# Patient Record
(demographics unavailable — no encounter records)

---

## 2024-12-13 NOTE — HEALTH RISK ASSESSMENT
[No] : In the past 12 months have you used drugs other than those required for medical reasons? No [Any fall with injury in past year] : Patient reported fall with injury in the past year [Patient not ambulatory] : Patient is not ambulatory [Low Salt Diet] : low salt [General Adherence] : general adherence [Learning/Retaining New Information] : difficulty learning/retaining new information [Handling Complex Tasks] : difficulty handling complex tasks [Transportation] : transportation [With Family] : lives with family [# of Members in Household ___] :  household currently consist of [unfilled] member(s) [Retired] : retired [] :  [Full assistance needed] : managing finances [With Patient/Caregiver] : , with patient/caregiver [Reviewed no changes] : Reviewed, no changes [DNR] : DNR [Last Verification Date: ___] : Last Verification Date: [unfilled] [I will adhere to the patient's wishes.] : I will adhere to the patient's wishes. [Time Spent: ___ minutes] : Time Spent: [unfilled] minutes [Name: ___] : Health Care Proxy's Name: [unfilled]  [Relationship: ___] : Relationship: [unfilled] [de-identified] : w/c use since fall in Oct [de-identified] : lives with daughter in law's brother, has 24/7 A private hire [FreeTextEntry1] : can brush her teeth [FreeTextEntry2] : assist with dressing [FreeTextEntry5] : can feed self with some foods [AdvancecareDate] : 12/24 [FreeTextEntry4] : has MOLST in the home, trial of non invasive respiratory support and feeding tube

## 2024-12-13 NOTE — PLAN
[FreeTextEntry1] : Rx for nebulizer sent to pharmacy [facility]  INR 5.3 Voicemail left for NP Pradeep instructed to hold coumadin dose for tonight follow up with Dr Gallegos on 12/13/24

## 2024-12-13 NOTE — HISTORY OF PRESENT ILLNESS
[Post-hospitalization from ___ Hospital] : Post-hospitalization from [unfilled] Hospital [Admitted on: ___] : The patient was admitted on [unfilled] [Discharged on ___] : discharged on [unfilled] [Discharge Summary] : discharge summary [Discharge Med List] : discharge medication list [Other: ____] : [unfilled] [Med Reconciliation] : medication reconciliation has been completed [Patient Contacted By: ____] : and contacted by [unfilled] [FreeTextEntry2] : 88F PMH vascular dementia (AOx2 baseline), HFrEF, chronic AFib and mechanical MVR (on Coumadin), breast cancer (s/p lumpectomy/RT) admit 10/2024 to [facility] d/t R pelvic rami and sacral ala fx s/p fall in addition to decompensated HFrEF c/c/b supratherapeutic INR dc'd to [facility], currently treated for Enterococcus UTI on ampicillin beginning 11/30 for planned 5 day course per ED, presents today for productive cough with CP/abd pain and EKG abn (automatic read resulted as acute MI) per ED. Patient AOx1 (self) at bedside which per health aide at bedside is part of waxing/waning baseline, is unable to meaningfully participate in interview, denies all complaints including CP/abd pain/SOB. Health aide confirms reason for transfer, otherwise does not offer more collateral. Attempt to contact emergency contact [facility] for collateral unsuccessful, left VM. neutrophil predominance w/o leukocytosis, ; HST 60 -> 65, proBNP 1145; K/ALP/AST elevations though hemolyzed; VBG 7.37/55/24/32 lactate 2.6   RSV+  CXR (prelim): Pulmonary edema, similar to prior exam. s/p lasix 40mg IV  (03 Dec 2024 02:27) Hospital Course: Respiratory syncytial virus (RSV). now on room air supportive care, monitor.Acute chest pain. rising trops-- will continue to trend until peak-- likely demand ischemia due to rsv per cards, no need for ischemic eval at this time TTE with severe TR and mod AS-- per structural heart to follow up outpatient once acute illness resolve  cardiology and structural heart following. Acute UTI on ampicillin 500mg PO q6h 11/30-12/5 by [facility] physician for Enterococcus UTI (per [facility] paperwork sensitive). Chronic HFrEF (heart failure with reduced ejection fraction) euvolemic TTE with severe TR and mod AS c/w lasix 20 mg po qod c/w Toprol XL 50mg QD cards following. Chronic atrial fibrillation +mech MVR INR 3.73- will give coumadin 2 mg today. Vascular dementia. AOx1 (self) at this time, aide at bedside states is part of waxing/waning baseline fall, aspiration precautions. Important Medication Changes and Reason: Active or Pending Issues Requiring Follow-up: Advanced Directives:   Full code  [x ] DNR  [ ] Hospice 89 y/o female seen today for TCM initial visit. She is frail, awake, confused sitting in wheelchair. Accompanied by daughter in law Maria Isabel who is assisting with translation, and private hire CLARENCE Irvin? She appears to be in no acute distress, breathing even and unlabored with report of episodes of coughing, noted to be pocketing food and spitting out small pieces. Reviewed the discharge instructions regarding puree diet with daughter in law Advised to resume puree diet until she is seen by S/S. Risk of aspiration pneumonia education provided. [facility] services provided by [facility] RN scheduled for Thursday, PT/OT S/S  Medication review done, pt does not have nebulizer, Rx sent [facility].  INR was done at home results 5.3, advised to hold coumadin. VM left for NP Fernández with results and coumadin hold.  TCM education provided and yellow card left in the home.

## 2024-12-13 NOTE — HISTORY OF PRESENT ILLNESS
[Post-hospitalization from ___ Hospital] : Post-hospitalization from [unfilled] Hospital [Admitted on: ___] : The patient was admitted on [unfilled] [Discharged on ___] : discharged on [unfilled] [Discharge Summary] : discharge summary [Discharge Med List] : discharge medication list [Other: ____] : [unfilled] [Med Reconciliation] : medication reconciliation has been completed [Patient Contacted By: ____] : and contacted by [unfilled] [FreeTextEntry2] : 88F PMH vascular dementia (AOx2 baseline), HFrEF, chronic AFib and mechanical MVR (on Coumadin), breast cancer (s/p lumpectomy/RT) admit 10/2024 to [facility] d/t R pelvic rami and sacral ala fx s/p fall in addition to decompensated HFrEF c/c/b supratherapeutic INR dc'd to [facility], currently treated for Enterococcus UTI on ampicillin beginning 11/30 for planned 5 day course per ED, presents today for productive cough with CP/abd pain and EKG abn (automatic read resulted as acute MI) per ED. Patient AOx1 (self) at bedside which per health aide at bedside is part of waxing/waning baseline, is unable to meaningfully participate in interview, denies all complaints including CP/abd pain/SOB. Health aide confirms reason for transfer, otherwise does not offer more collateral. Attempt to contact emergency contact [facility] for collateral unsuccessful, left VM. neutrophil predominance w/o leukocytosis, ; HST 60 -> 65, proBNP 1145; K/ALP/AST elevations though hemolyzed; VBG 7.37/55/24/32 lactate 2.6   RSV+  CXR (prelim): Pulmonary edema, similar to prior exam. s/p lasix 40mg IV  (03 Dec 2024 02:27) Hospital Course: Respiratory syncytial virus (RSV). now on room air supportive care, monitor.Acute chest pain. rising trops-- will continue to trend until peak-- likely demand ischemia due to rsv per cards, no need for ischemic eval at this time TTE with severe TR and mod AS-- per structural heart to follow up outpatient once acute illness resolve  cardiology and structural heart following. Acute UTI on ampicillin 500mg PO q6h 11/30-12/5 by [facility] physician for Enterococcus UTI (per [facility] paperwork sensitive). Chronic HFrEF (heart failure with reduced ejection fraction) euvolemic TTE with severe TR and mod AS c/w lasix 20 mg po qod c/w Toprol XL 50mg QD cards following. Chronic atrial fibrillation +mech MVR INR 3.73- will give coumadin 2 mg today. Vascular dementia. AOx1 (self) at this time, aide at bedside states is part of waxing/waning baseline fall, aspiration precautions. Important Medication Changes and Reason: Active or Pending Issues Requiring Follow-up: Advanced Directives:   Full code  [x ] DNR  [ ] Hospice 89 y/o female seen today for TCM initial visit. She is frail, awake, confused sitting in wheelchair. Accompanied by daughter in law Maria Isabel who is assisting with translation, and private hire CLARENCE Irvin? She appears to be in no acute distress, breathing even and unlabored with report of episodes of coughing, noted to be pocketing food and spitting out small pieces. Reviewed the discharge instructions regarding puree diet with daughter in law Advised to resume puree diet until she is seen by S/S. Risk of aspiration pneumonia education provided. [facility] services provided by [facility] RN scheduled for Thursday, PT/OT S/S  Medication review done, pt does not have nebulizer, Rx sent [facility].  INR was done at home results 5.3, advised to hold coumadin. VM left for NP Fernández with results and coumadin hold.  TCM education provided and yellow card left in the home.

## 2024-12-13 NOTE — HEALTH RISK ASSESSMENT
[No] : In the past 12 months have you used drugs other than those required for medical reasons? No [Any fall with injury in past year] : Patient reported fall with injury in the past year [Patient not ambulatory] : Patient is not ambulatory [Low Salt Diet] : low salt [General Adherence] : general adherence [Learning/Retaining New Information] : difficulty learning/retaining new information [Handling Complex Tasks] : difficulty handling complex tasks [Transportation] : transportation [With Family] : lives with family [# of Members in Household ___] :  household currently consist of [unfilled] member(s) [Retired] : retired [] :  [Full assistance needed] : managing finances [With Patient/Caregiver] : , with patient/caregiver [Reviewed no changes] : Reviewed, no changes [DNR] : DNR [Last Verification Date: ___] : Last Verification Date: [unfilled] [I will adhere to the patient's wishes.] : I will adhere to the patient's wishes. [Time Spent: ___ minutes] : Time Spent: [unfilled] minutes [Name: ___] : Health Care Proxy's Name: [unfilled]  [Relationship: ___] : Relationship: [unfilled] [de-identified] : w/c use since fall in Oct [de-identified] : lives with daughter in law's brother, has 24/7 A private hire [FreeTextEntry1] : can brush her teeth [FreeTextEntry2] : assist with dressing [FreeTextEntry5] : can feed self with some foods [AdvancecareDate] : 12/24 [FreeTextEntry4] : has MOLST in the home, trial of non invasive respiratory support and feeding tube

## 2024-12-13 NOTE — HISTORY OF PRESENT ILLNESS
[Post-hospitalization from ___ Hospital] : Post-hospitalization from [unfilled] Hospital [Admitted on: ___] : The patient was admitted on [unfilled] [Discharged on ___] : discharged on [unfilled] [Discharge Summary] : discharge summary [Discharge Med List] : discharge medication list [Other: ____] : [unfilled] [Med Reconciliation] : medication reconciliation has been completed [Patient Contacted By: ____] : and contacted by [unfilled] [FreeTextEntry2] : 88F PMH vascular dementia (AOx2 baseline), HFrEF, chronic AFib and mechanical MVR (on Coumadin), breast cancer (s/p lumpectomy/RT) admit 10/2024 to [facility] d/t R pelvic rami and sacral ala fx s/p fall in addition to decompensated HFrEF c/c/b supratherapeutic INR dc'd to [facility], currently treated for Enterococcus UTI on ampicillin beginning 11/30 for planned 5 day course per ED, presents today for productive cough with CP/abd pain and EKG abn (automatic read resulted as acute MI) per ED. Patient AOx1 (self) at bedside which per health aide at bedside is part of waxing/waning baseline, is unable to meaningfully participate in interview, denies all complaints including CP/abd pain/SOB. Health aide confirms reason for transfer, otherwise does not offer more collateral. Attempt to contact emergency contact [facility] for collateral unsuccessful, left VM. neutrophil predominance w/o leukocytosis, ; HST 60 -> 65, proBNP 1145; K/ALP/AST elevations though hemolyzed; VBG 7.37/55/24/32 lactate 2.6   RSV+  CXR (prelim): Pulmonary edema, similar to prior exam. s/p lasix 40mg IV  (03 Dec 2024 02:27) Hospital Course: Respiratory syncytial virus (RSV). now on room air supportive care, monitor.Acute chest pain. rising trops-- will continue to trend until peak-- likely demand ischemia due to rsv per cards, no need for ischemic eval at this time TTE with severe TR and mod AS-- per structural heart to follow up outpatient once acute illness resolve  cardiology and structural heart following. Acute UTI on ampicillin 500mg PO q6h 11/30-12/5 by [facility] physician for Enterococcus UTI (per [facility] paperwork sensitive). Chronic HFrEF (heart failure with reduced ejection fraction) euvolemic TTE with severe TR and mod AS c/w lasix 20 mg po qod c/w Toprol XL 50mg QD cards following. Chronic atrial fibrillation +mech MVR INR 3.73- will give coumadin 2 mg today. Vascular dementia. AOx1 (self) at this time, aide at bedside states is part of waxing/waning baseline fall, aspiration precautions. Important Medication Changes and Reason: Active or Pending Issues Requiring Follow-up: Advanced Directives:   Full code  [x ] DNR  [ ] Hospice 89 y/o female seen today for TCM initial visit. She is frail, awake, confused sitting in wheelchair. Accompanied by daughter in law Maria Isabel who is assisting with translation, and private hire CLARENCE Irvin? She appears to be in no acute distress, breathing even and unlabored with report of episodes of coughing, noted to be pocketing food and spitting out small pieces. Reviewed the discharge instructions regarding puree diet with daughter in law Advised to resume puree diet until she is seen by S/S. Risk of aspiration pneumonia education provided. CH[facility] services provided by [facility] RN scheduled for Thursday, PT/OT S/S  Medication review done, pt does not have nebulizer, Rx sent to [facility].  INR was done at home results 5.3, advised to hold coumadin. VM left for NP Fernández with results and coumadin hold.  TCM education provided and yellow card left in the home.

## 2024-12-13 NOTE — HISTORY OF PRESENT ILLNESS
[Post-hospitalization from ___ Hospital] : Post-hospitalization from [unfilled] Hospital [Admitted on: ___] : The patient was admitted on [unfilled] [Discharged on ___] : discharged on [unfilled] [Discharge Summary] : discharge summary [Discharge Med List] : discharge medication list [Other: ____] : [unfilled] [Med Reconciliation] : medication reconciliation has been completed [Patient Contacted By: ____] : and contacted by [unfilled] [FreeTextEntry2] : 88F PMH vascular dementia (AOx2 baseline), HFrEF, chronic AFib and mechanical MVR (on Coumadin), breast cancer (s/p lumpectomy/RT) admit 10/2024 to [facility] d/t R pelvic rami and sacral ala fx s/p fall in addition to decompensated HFrEF c/c/b supratherapeutic INR dc'd to [facility], currently treated for Enterococcus UTI on ampicillin beginning 11/30 for planned 5 day course per ED, presents today for productive cough with CP/abd pain and EKG abn (automatic read resulted as acute MI) per ED. Patient AOx1 (self) at bedside which per health aide at bedside is part of waxing/waning baseline, is unable to meaningfully participate in interview, denies all complaints including CP/abd pain/SOB. Health aide confirms reason for transfer, otherwise does not offer more collateral. Attempt to contact emergency contact [facility] for collateral unsuccessful, left VM. neutrophil predominance w/o leukocytosis, ; HST 60 -> 65, proBNP 1145; K/ALP/AST elevations though hemolyzed; VBG 7.37/55/24/32 lactate 2.6   RSV+  CXR (prelim): Pulmonary edema, similar to prior exam. s/p lasix 40mg IV  (03 Dec 2024 02:27) Hospital Course: Respiratory syncytial virus (RSV). now on room air supportive care, monitor.Acute chest pain. rising trops-- will continue to trend until peak-- likely demand ischemia due to rsv per cards, no need for ischemic eval at this time TTE with severe TR and mod AS-- per structural heart to follow up outpatient once acute illness resolve  cardiology and structural heart following. Acute UTI on ampicillin 500mg PO q6h 11/30-12/5 by [facility] physician for Enterococcus UTI (per [facility] paperwork sensitive). Chronic HFrEF (heart failure with reduced ejection fraction) euvolemic TTE with severe TR and mod AS c/w lasix 20 mg po qod c/w Toprol XL 50mg QD cards following. Chronic atrial fibrillation +mech MVR INR 3.73- will give coumadin 2 mg today. Vascular dementia. AOx1 (self) at this time, aide at bedside states is part of waxing/waning baseline fall, aspiration precautions. Important Medication Changes and Reason: Active or Pending Issues Requiring Follow-up: Advanced Directives:   Full code  [x ] DNR  [ ] Hospice 87 y/o female seen today for TCM initial visit. She is frail, awake, confused sitting in wheelchair. Accompanied by daughter in law Maria Isabel who is assisting with translation, and private hire CLARENCE Irvin? She appears to be in no acute distress, breathing even and unlabored with report of episodes of coughing, noted to be pocketing food and spitting out small pieces. Reviewed the discharge instructions regarding puree diet with daughter in law Advised to resume puree diet until she is seen by S/S. Risk of aspiration pneumonia education provided. CHHA services provided by [facility] RN scheduled for Thursday, PT/OT S/S  Medication review done, pt does not have nebulizer, Rx sent [facility].  INR was done at home results 5.3, advised to hold coumadin. VM left for NP Fernández with results and coumadin hold.  TCM education provided and yellow card left in the home.

## 2024-12-13 NOTE — HEALTH RISK ASSESSMENT
[No] : In the past 12 months have you used drugs other than those required for medical reasons? No [Any fall with injury in past year] : Patient reported fall with injury in the past year [Patient not ambulatory] : Patient is not ambulatory [Low Salt Diet] : low salt [General Adherence] : general adherence [Learning/Retaining New Information] : difficulty learning/retaining new information [Handling Complex Tasks] : difficulty handling complex tasks [Transportation] : transportation [With Family] : lives with family [# of Members in Household ___] :  household currently consist of [unfilled] member(s) [Retired] : retired [] :  [Full assistance needed] : managing finances [With Patient/Caregiver] : , with patient/caregiver [Reviewed no changes] : Reviewed, no changes [DNR] : DNR [Last Verification Date: ___] : Last Verification Date: [unfilled] [I will adhere to the patient's wishes.] : I will adhere to the patient's wishes. [Time Spent: ___ minutes] : Time Spent: [unfilled] minutes [Name: ___] : Health Care Proxy's Name: [unfilled]  [Relationship: ___] : Relationship: [unfilled] [de-identified] : w/c use since fall in Oct [de-identified] : lives with daughter in law's brother, has 24/7 A private hire [FreeTextEntry1] : can brush her teeth [FreeTextEntry2] : assist with dressing [FreeTextEntry5] : can feed self with some foods [AdvancecareDate] : 12/24 [FreeTextEntry4] : has MOLST in the home, trial of non invasive respiratory support and feeding tube

## 2024-12-13 NOTE — HEALTH RISK ASSESSMENT
[No] : In the past 12 months have you used drugs other than those required for medical reasons? No [Any fall with injury in past year] : Patient reported fall with injury in the past year [Patient not ambulatory] : Patient is not ambulatory [Low Salt Diet] : low salt [General Adherence] : general adherence [Learning/Retaining New Information] : difficulty learning/retaining new information [Handling Complex Tasks] : difficulty handling complex tasks [Transportation] : transportation [With Family] : lives with family [# of Members in Household ___] :  household currently consist of [unfilled] member(s) [Retired] : retired [] :  [Full assistance needed] : managing finances [With Patient/Caregiver] : , with patient/caregiver [Reviewed no changes] : Reviewed, no changes [DNR] : DNR [Last Verification Date: ___] : Last Verification Date: [unfilled] [I will adhere to the patient's wishes.] : I will adhere to the patient's wishes. [Time Spent: ___ minutes] : Time Spent: [unfilled] minutes [Name: ___] : Health Care Proxy's Name: [unfilled]  [Relationship: ___] : Relationship: [unfilled] [de-identified] : w/c use since fall in Oct [de-identified] : lives with daughter in law's brother, has 24/7 A private hire [FreeTextEntry1] : can brush her teeth [FreeTextEntry2] : assist with dressing [FreeTextEntry5] : can feed self with some foods [AdvancecareDate] : 12/24 [FreeTextEntry4] : has MOLST in the home, trial of non invasive respiratory support and feeding tube

## 2024-12-13 NOTE — PHYSICAL EXAM
[No Respiratory Distress] : no respiratory distress  [No Accessory Muscle Use] : no accessory muscle use [Normal Rate] : normal rate  [Regular Rhythm] : with a regular rhythm [Pedal Pulses Present] : the pedal pulses are present [No Edema] : there was no peripheral edema [No Extremity Clubbing/Cyanosis] : no extremity clubbing/cyanosis [Soft] : abdomen soft [Non Tender] : non-tender [Normal Bowel Sounds] : normal bowel sounds [de-identified] : frail, thin  [de-identified] : pocketing food [de-identified] : scattered inspiratory wheezes. and occasional cough [de-identified] : alert and oriented x 1

## 2024-12-13 NOTE — PHYSICAL EXAM
[No Respiratory Distress] : no respiratory distress  [No Accessory Muscle Use] : no accessory muscle use [Normal Rate] : normal rate  [Regular Rhythm] : with a regular rhythm [Pedal Pulses Present] : the pedal pulses are present [No Edema] : there was no peripheral edema [No Extremity Clubbing/Cyanosis] : no extremity clubbing/cyanosis [Soft] : abdomen soft [Non Tender] : non-tender [Normal Bowel Sounds] : normal bowel sounds [de-identified] : frail, thin  [de-identified] : pocketing food [de-identified] : scattered inspiratory wheezes. and occasional cough [de-identified] : alert and oriented x 1

## 2024-12-13 NOTE — REVIEW OF SYSTEMS
[Nasal Discharge] : nasal discharge [Wheezing] : wheezing [Cough] : cough [Incontinence] : incontinence [Muscle Weakness] : muscle weakness [Memory Loss] : memory loss [Negative] : Heme/Lymph [Lower Ext Edema] : no lower extremity edema [Shortness Of Breath] : no shortness of breath [Dyspnea on Exertion] : no dyspnea on exertion [FreeTextEntry4] : chronic

## 2024-12-13 NOTE — PHYSICAL EXAM
[No Respiratory Distress] : no respiratory distress  [No Accessory Muscle Use] : no accessory muscle use [Normal Rate] : normal rate  [Regular Rhythm] : with a regular rhythm [Pedal Pulses Present] : the pedal pulses are present [No Edema] : there was no peripheral edema [No Extremity Clubbing/Cyanosis] : no extremity clubbing/cyanosis [Soft] : abdomen soft [Non Tender] : non-tender [Normal Bowel Sounds] : normal bowel sounds [de-identified] : frail, thin  [de-identified] : pocketing food [de-identified] : scattered inspiratory wheezes. and occasional cough [de-identified] : alert and oriented x 1

## 2025-01-06 NOTE — HISTORY OF PRESENT ILLNESS
[FreeTextEntry1] : severe dementia [FreeTextEntry2] : GUDELIA FERRER is a 88 year woman with pmhx of CHF s/p mechanical mitral valve (2007, on warfarin), aortic stenosis, afib (on warfarin), dementia, pelvic fracture (10/2024) who is being seen for initial visit.   House Calls is primary.  Accompanying patient today is son, Salazar.    Current concerns: -2.5-3.5 for INR. takes xanax before INR.  -behavioral problems with dementia. gets physically aggressive if doing something she doesn't want to do. has 2 aides during the day and they are able to calm her down. had adverse reaction to lexapro previously. does not want to start medication at this time.  -sons unsure of EF -soft diet. diet has improved recently. INRs have been labile.  -changing senna to daily  Last hospitalization: 10/2024 for pelvic fracture, 12/2024 for RSV c/b CHF exacerbation    Geriatric Assessment: -appetite improving after hospitalization 10/2024. soft diet. consistent with foods affecting INR.  -No changes in dentition or swallowing reported. -No changes in hearing or vision reported. -chronic progressive dementia  -No changes in bowel movements or urination. -Patient denies any symptoms of depression or anxiety. -Pt needs full assistance with ADLs and IADLs. -Gait: two person assist  -Patient's home environment is safe. -Goals of care discussed. MOLST completed. DNR/DNI. trial of noninvasive, no HD. yes fluids and abx. limited care.  -HCP: n/a due to dementia  -HHA: 24 hour private aide    Chronic Medical Problems:  -dementia: severe. A&O x1-2. has 24 hour HHA. gets agitated and sometimes physically aggressive. adverse reaction to lexapro. takes xanax before INR done.  -chf: unsure of EF. on lasix 20mg every other day, metop -afib: on metop. AC is warfarin due to mechanical mitral valve. INR managed by non-house call provider. follows with cards. -mechanical mitral valve: approx 2007. on warfarin with INR goal of 2.5-3.5 -pelvic fracture: 10/2024 s/p mechanical fall     Specialists: -cardiology

## 2025-02-05 NOTE — PHYSICAL EXAM
[No Acute Distress] : no acute distress [Normal Voice/Communication] : normal voice communication [PERRL] : pupils equal, round and reactive to light [Normal Oropharynx] : the oropharynx was normal [Normal TMs] : both tympanic membranes were normal [Thyroid Normal, No Nodules] : the thyroid was normal and there were no nodules present [No Respiratory Distress] : no respiratory distress [Clear to Auscultation] : lungs were clear to auscultation bilaterally [No Accessory Muscle Use] : no accessory muscle use [Normal Rate] : heart rate was normal  [Regular Rhythm] : with a regular rhythm [Normal S1, S2] : normal S1 and S2 [No Murmurs] : no murmurs heard [Pedal Pulses Present] : the pedal pulses are present [No Edema] : there was no peripheral edema [Non Tender] : non-tender [Soft] : abdomen soft [Not Distended] : not distended [No Joint Swelling] : no joint swelling seen [de-identified] : sitting in wheelchair [de-identified] : A&O x1-2

## 2025-02-05 NOTE — HISTORY OF PRESENT ILLNESS
[Family Member] : family member [Formal Caregiver] : formal caregiver [FreeTextEntry1] : severe dementia [FreeTextEntry2] : GUDELIA FERRER is a 88 year woman with pmhx of CHF s/p mechanical mitral valve (2007, on warfarin), aortic stenosis, afib (on warfarin), dementia, pelvic fracture (10/2024) who is being seen for fu.  [facility] [employer] is primary.  Accompanying patient today is son, Salazar.    Current concerns: -12/2024 echo: normal LV function. RV enlarged with normal function. mild to mod aortic stenosis. 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  -recliner  -Karyna [phone] -had previously been on lexapro approx 1 year ago. had drowsiness and weight loss.   Last hospitalization: 10/2024 for pelvic fracture, 12/2024 for RSV c/b CHF exacerbation    Geriatric Assessment: -appetite improving after hospitalization 10/2024. soft diet. consistent with foods affecting INR.  -No changes in dentition or swallowing reported. -No changes in hearing or vision reported. -chronic progressive dementia  -No changes in bowel movements or urination. -Patient denies any symptoms of depression or anxiety. -Pt needs full assistance with ADLs and IADLs. -Gait: two person assist  -Patient's home environment is safe. -Goals of care discussed. MOLST completed. DNR/DNI. trial of noninvasive, no HD. yes fluids and abx. limited care.  -HCP: n/a due to dementia  -HHA: 24 hour private aide    Chronic Medical Problems:  -dementia: severe. A&O x1-2. has 24 hour HHA. gets agitated and sometimes physically aggressive. adverse reaction to lexapro. takes xanax before INR done.  -chf: unsure of EF. on lasix 20mg every other day, metop -afib: on metop. AC is warfarin due to mechanical mitral valve. INR managed by non-house call provider. follows with cards. -mechanical mitral valve: approx 2007. on warfarin with INR goal of 2.5-3.5 -pelvic fracture: 10/2024 s/p mechanical fall     Specialists: -cardiology

## 2025-06-27 NOTE — HEALTH RISK ASSESSMENT
[Full assistance needed] : managing finances [No falls in past year] : Patient reported no falls in the past year [No] : The patient does not have visual impairment [TimeGetUpGo] : 0 [de-identified] : Chairbound

## 2025-06-27 NOTE — HISTORY OF PRESENT ILLNESS
[Family Member] : family member [Formal Caregiver] : formal caregiver [FreeTextEntry1] : severe dementia [FreeTextEntry2] : 06/27/2025 COVID SCREEN: Patient or caretaker denies fever, cough, trouble breathing, rash, vomiting. Patient has not been in close contact with anyone who is COVID-19 positive, or suspected of having COVID-19.   N95 mask, gloves, eye wear and gown (if indicated) used during visit: Yes.   GUDELIA FERRER is a 88 year woman with CHF s/p mechanical mitral valve (2007, on warfarin), aortic stenosis, afib (on warfarin), dementia, pelvic fracture (10/2024) who is being seen for follow up home visit.  Accompanying patient today is [facility].    Current concerns: - No interim events reported -12/2024 echo: normal LV function. RV enlarged with normal function. mild to mod aortic stenosis. 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  -recliner  -Karyna [phone] -had previously been on lexapro approx 1 year ago. had drowsiness and weight loss. spoke with dtr who is hesitant to start new medications.   Last hospitalization: 10/2024 for pelvic fracture, 12/2024 for RSV c/b CHF exacerbation    Geriatric Assessment: -appetite improving after hospitalization 10/2024. soft diet. consistent with foods affecting INR.  -No changes in dentition or swallowing reported. -No changes in hearing or vision reported. -chronic progressive dementia  -No changes in bowel movements or urination. -Patient denies any symptoms of depression or anxiety. -Pt needs full assistance with ADLs and IADLs. -Gait: two person assist  -Patient's home environment is safe. -Goals of care discussed. MOLST completed. DNR/DNI. trial of noninvasive, no HD. yes fluids and abx. limited care.  -HCP: n/a due to dementia  -HHA: 24 hour private aide    Chronic Medical Problems:  -dementia: severe. A&O x1-2. has 24 hour HHA. gets agitated and sometimes physically aggressive. adverse reaction to lexapro. takes xanax before INR done.  -chf: unsure of EF. on lasix 20mg every other day, metop -afib: on metop. AC is warfarin due to mechanical mitral valve. INR managed by non-house call provider. follows with cards. -mechanical mitral valve: approx 2007. on warfarin with INR goal of 2.5-3.5 -pelvic fracture: 10/2024 s/p mechanical fall     Specialists: -cardiology   NEED FOR GERIATRIC CHAIR:   -Pt requires a geriatric chair. Pt needs more substantial seating platform than is provided by a conventional wheelchair.   -A wheelchair is no longer adequate and is ruled out completely as patient's condition has deteriorated.   -Pt is homebound due to severe dementia and is dependent for all ADLs.   -Pt has caregiver willing and able to assist with use of the geriatric chair.   -Pt's home provides adequate access for the use of the chair in the home.   -Ordering Emilia Chair

## 2025-06-27 NOTE — REASON FOR VISIT
[Follow-Up] : a follow-up visit [FreeTextEntry1] : CHF s/p mechanical mitral valve (2007, on warfarin), aortic stenosis, afib (on warfarin), dementia, pelvic fracture (10/2024)

## 2025-06-27 NOTE — PHYSICAL EXAM
[No Acute Distress] : no acute distress [Normal Voice/Communication] : normal voice communication [Normal Sclera/Conjunctiva] : normal sclera/conjunctiva [PERRL] : pupils equal, round and reactive to light [EOMI] : extra ocular movement intact [Normal Outer Ear/Nose] : the ears and nose were normal in appearance [Normal Oropharynx] : the oropharynx was normal [No JVD] : no jugular venous distention [Supple] : the neck was supple [No Respiratory Distress] : no respiratory distress [Clear to Auscultation] : lungs were clear to auscultation bilaterally [No Accessory Muscle Use] : no accessory muscle use [Normal Rate] : heart rate was normal  [Regular Rhythm] : with a regular rhythm [Normal S1, S2] : normal S1 and S2 [No Murmurs] : no murmurs heard [No Edema] : there was no peripheral edema [Normal Bowel Sounds] : normal bowel sounds [Non Tender] : non-tender [Soft] : abdomen soft [Not Distended] : not distended [Normal Post Cervical Nodes] : no posterior cervical lymphadenopathy [Normal Anterior Cervical Nodes] : no anterior cervical lymphadenopathy [No CVA Tenderness] : no ~M costovertebral angle tenderness [No Spinal Tenderness] : no spinal tenderness [Normal Gait] : normal gait [Normal Strength/Tone] : muscle strength and tone were normal [No Rash] : no rash [Oriented x3] : oriented to person, place, and time [Normal Affect] : the affect was normal [de-identified] : sitting in wheelchair. in good spirits [de-identified] : A&O x1, minimal speech